# Patient Record
Sex: FEMALE | Race: OTHER | HISPANIC OR LATINO
[De-identification: names, ages, dates, MRNs, and addresses within clinical notes are randomized per-mention and may not be internally consistent; named-entity substitution may affect disease eponyms.]

---

## 2018-01-31 ENCOUNTER — APPOINTMENT (OUTPATIENT)
Dept: NEUROLOGY | Facility: CLINIC | Age: 43
End: 2018-01-31
Payer: MEDICAID

## 2018-01-31 VITALS
BODY MASS INDEX: 30.21 KG/M2 | HEIGHT: 61 IN | DIASTOLIC BLOOD PRESSURE: 80 MMHG | WEIGHT: 160 LBS | SYSTOLIC BLOOD PRESSURE: 130 MMHG

## 2018-01-31 PROCEDURE — 99214 OFFICE O/P EST MOD 30 MIN: CPT

## 2018-04-04 ENCOUNTER — APPOINTMENT (OUTPATIENT)
Dept: NEUROLOGY | Facility: CLINIC | Age: 43
End: 2018-04-04
Payer: MEDICAID

## 2018-04-04 VITALS
WEIGHT: 160 LBS | BODY MASS INDEX: 30.21 KG/M2 | DIASTOLIC BLOOD PRESSURE: 74 MMHG | SYSTOLIC BLOOD PRESSURE: 132 MMHG | HEIGHT: 61 IN

## 2018-04-04 DIAGNOSIS — G44.89 OTHER HEADACHE SYNDROME: ICD-10-CM

## 2018-04-04 DIAGNOSIS — Z86.59 PERSONAL HISTORY OF OTHER MENTAL AND BEHAVIORAL DISORDERS: ICD-10-CM

## 2018-04-04 PROCEDURE — 99214 OFFICE O/P EST MOD 30 MIN: CPT

## 2018-04-04 RX ORDER — FLUOXETINE HYDROCHLORIDE 20 MG/1
20 TABLET ORAL
Refills: 0 | Status: COMPLETED | COMMUNITY
End: 2018-04-04

## 2018-04-04 RX ORDER — TOPIRAMATE 25 MG/1
25 TABLET, FILM COATED ORAL
Qty: 30 | Refills: 2 | Status: COMPLETED | COMMUNITY
Start: 2018-01-31 | End: 2018-04-04

## 2018-05-16 ENCOUNTER — APPOINTMENT (OUTPATIENT)
Dept: NEUROLOGY | Facility: CLINIC | Age: 43
End: 2018-05-16

## 2018-06-10 ENCOUNTER — EMERGENCY (EMERGENCY)
Facility: HOSPITAL | Age: 43
LOS: 1 days | Discharge: HOME | End: 2018-06-10
Attending: EMERGENCY MEDICINE
Payer: COMMERCIAL

## 2018-06-10 VITALS
SYSTOLIC BLOOD PRESSURE: 132 MMHG | RESPIRATION RATE: 18 BRPM | OXYGEN SATURATION: 98 % | TEMPERATURE: 98 F | HEART RATE: 68 BPM | DIASTOLIC BLOOD PRESSURE: 72 MMHG

## 2018-06-10 VITALS — HEIGHT: 62 IN | WEIGHT: 139.99 LBS

## 2018-06-10 LAB
ALBUMIN SERPL ELPH-MCNC: 4.3 G/DL — SIGNIFICANT CHANGE UP (ref 3.3–5.2)
ALP SERPL-CCNC: 49 U/L — SIGNIFICANT CHANGE UP (ref 40–120)
ALT FLD-CCNC: 14 U/L — SIGNIFICANT CHANGE UP
ANION GAP SERPL CALC-SCNC: 11 MMOL/L — SIGNIFICANT CHANGE UP (ref 5–17)
AST SERPL-CCNC: 18 U/L — SIGNIFICANT CHANGE UP
BASOPHILS # BLD AUTO: 0 K/UL — SIGNIFICANT CHANGE UP (ref 0–0.2)
BASOPHILS NFR BLD AUTO: 0.5 % — SIGNIFICANT CHANGE UP (ref 0–2)
BILIRUB SERPL-MCNC: 0.2 MG/DL — LOW (ref 0.4–2)
BUN SERPL-MCNC: 18 MG/DL — SIGNIFICANT CHANGE UP (ref 8–20)
CALCIUM SERPL-MCNC: 9 MG/DL — SIGNIFICANT CHANGE UP (ref 8.6–10.2)
CHLORIDE SERPL-SCNC: 106 MMOL/L — SIGNIFICANT CHANGE UP (ref 98–107)
CK MB CFR SERPL CALC: 4.3 NG/ML — SIGNIFICANT CHANGE UP (ref 0–6.7)
CK SERPL-CCNC: 229 U/L — HIGH (ref 25–170)
CO2 SERPL-SCNC: 24 MMOL/L — SIGNIFICANT CHANGE UP (ref 22–29)
CREAT SERPL-MCNC: 0.66 MG/DL — SIGNIFICANT CHANGE UP (ref 0.5–1.3)
EOSINOPHIL # BLD AUTO: 0.1 K/UL — SIGNIFICANT CHANGE UP (ref 0–0.5)
EOSINOPHIL NFR BLD AUTO: 1.5 % — SIGNIFICANT CHANGE UP (ref 0–6)
GLUCOSE SERPL-MCNC: 110 MG/DL — SIGNIFICANT CHANGE UP (ref 70–115)
HCG SERPL-ACNC: <5 MIU/ML — SIGNIFICANT CHANGE UP
HCT VFR BLD CALC: 39.7 % — SIGNIFICANT CHANGE UP (ref 37–47)
HGB BLD-MCNC: 12.9 G/DL — SIGNIFICANT CHANGE UP (ref 12–16)
LYMPHOCYTES # BLD AUTO: 1.8 K/UL — SIGNIFICANT CHANGE UP (ref 1–4.8)
LYMPHOCYTES # BLD AUTO: 44.3 % — SIGNIFICANT CHANGE UP (ref 20–55)
MCHC RBC-ENTMCNC: 28.4 PG — SIGNIFICANT CHANGE UP (ref 27–31)
MCHC RBC-ENTMCNC: 32.5 G/DL — SIGNIFICANT CHANGE UP (ref 32–36)
MCV RBC AUTO: 87.4 FL — SIGNIFICANT CHANGE UP (ref 81–99)
MONOCYTES # BLD AUTO: 0.5 K/UL — SIGNIFICANT CHANGE UP (ref 0–0.8)
MONOCYTES NFR BLD AUTO: 12.8 % — HIGH (ref 3–10)
NEUTROPHILS # BLD AUTO: 1.6 K/UL — LOW (ref 1.8–8)
NEUTROPHILS NFR BLD AUTO: 40.9 % — SIGNIFICANT CHANGE UP (ref 37–73)
PLATELET # BLD AUTO: 240 K/UL — SIGNIFICANT CHANGE UP (ref 150–400)
POTASSIUM SERPL-MCNC: 4 MMOL/L — SIGNIFICANT CHANGE UP (ref 3.5–5.3)
POTASSIUM SERPL-SCNC: 4 MMOL/L — SIGNIFICANT CHANGE UP (ref 3.5–5.3)
PROT SERPL-MCNC: 7.8 G/DL — SIGNIFICANT CHANGE UP (ref 6.6–8.7)
RBC # BLD: 4.54 M/UL — SIGNIFICANT CHANGE UP (ref 4.4–5.2)
RBC # FLD: 13.9 % — SIGNIFICANT CHANGE UP (ref 11–15.6)
SODIUM SERPL-SCNC: 141 MMOL/L — SIGNIFICANT CHANGE UP (ref 135–145)
TROPONIN T SERPL-MCNC: <0.01 NG/ML — SIGNIFICANT CHANGE UP (ref 0–0.06)
WBC # BLD: 4 K/UL — LOW (ref 4.8–10.8)
WBC # FLD AUTO: 4 K/UL — LOW (ref 4.8–10.8)

## 2018-06-10 RX ORDER — ACETAMINOPHEN 500 MG
650 TABLET ORAL ONCE
Qty: 0 | Refills: 0 | Status: COMPLETED | OUTPATIENT
Start: 2018-06-10 | End: 2018-06-10

## 2018-06-10 RX ADMIN — Medication 650 MILLIGRAM(S): at 15:22

## 2018-06-10 NOTE — ED STATDOCS - ATTENDING CONTRIBUTION TO CARE
seen with acp  c/o chest pain radiating to the elbow  family hx of cardiac problems  EKG shows sinus bradycardia rate of 50  will transfer to Main for monitoring  Agree with acps assessment hx and physical

## 2018-06-10 NOTE — ED PROVIDER NOTE - MEDICAL DECISION MAKING DETAILS
41 yo F with elbow pain. Likely due to overuse vs radiculopathy. Check labs and eval for cardiac etiology, though clinically doubt.

## 2018-06-10 NOTE — ED STATDOCS - PROGRESS NOTE DETAILS
patient c/o R arm pain radiating up her chest started yesterday worsening today, denies any trauma or falls, +family h/o MI, denies smoking or h/o HTN/DM, EKG ordered and labs ordered placed in MAIN for monitor setting for atypical CP

## 2018-06-10 NOTE — ED PROVIDER NOTE - MUSCULOSKELETAL, MLM
TTP diffusely over the R shoulder and right elbow, and TTP to the R upper chest, no effusion, no swelling, sensation intact, 2+ radial and ulnar pulses

## 2018-06-10 NOTE — ED PROVIDER NOTE - OBJECTIVE STATEMENT
Pt is a 41 y/o F presenting to the ED with c/o right elbow pain, onset 2 days. Pt states the pain has been gradual, with progression of pain up the arm and into the R side of her chest. It is worse with movement of the arm and relieved with rest. Denies SOB, N/V, SWEET. Pain is not worse with general exertion. Denies trauma. Pt took motrin prior to arrival with some improvement in pain. Reports FHx of cardiac disease. Denies PMHx. Works as a .

## 2019-01-12 ENCOUNTER — EMERGENCY (EMERGENCY)
Facility: HOSPITAL | Age: 44
LOS: 1 days | Discharge: HOME | End: 2019-01-12
Attending: EMERGENCY MEDICINE
Payer: COMMERCIAL

## 2019-01-12 VITALS
RESPIRATION RATE: 16 BRPM | OXYGEN SATURATION: 99 % | HEIGHT: 63 IN | DIASTOLIC BLOOD PRESSURE: 85 MMHG | SYSTOLIC BLOOD PRESSURE: 128 MMHG | WEIGHT: 160.06 LBS | TEMPERATURE: 98 F | HEART RATE: 63 BPM

## 2019-01-12 DIAGNOSIS — Z90.49 ACQUIRED ABSENCE OF OTHER SPECIFIED PARTS OF DIGESTIVE TRACT: Chronic | ICD-10-CM

## 2019-01-12 DIAGNOSIS — Z98.51 TUBAL LIGATION STATUS: Chronic | ICD-10-CM

## 2019-01-12 LAB
ALBUMIN SERPL ELPH-MCNC: 4.4 G/DL — SIGNIFICANT CHANGE UP (ref 3.3–5.2)
ALP SERPL-CCNC: 52 U/L — SIGNIFICANT CHANGE UP (ref 40–120)
ALT FLD-CCNC: 13 U/L — SIGNIFICANT CHANGE UP
ANION GAP SERPL CALC-SCNC: 4 MMOL/L — LOW (ref 5–17)
APTT BLD: 30.2 SEC — SIGNIFICANT CHANGE UP (ref 27.5–36.3)
AST SERPL-CCNC: 18 U/L — SIGNIFICANT CHANGE UP
BILIRUB SERPL-MCNC: <0.2 MG/DL — LOW (ref 0.4–2)
BUN SERPL-MCNC: 20 MG/DL — SIGNIFICANT CHANGE UP (ref 8–20)
CALCIUM SERPL-MCNC: 9.2 MG/DL — SIGNIFICANT CHANGE UP (ref 8.6–10.2)
CHLORIDE SERPL-SCNC: 104 MMOL/L — SIGNIFICANT CHANGE UP (ref 98–107)
CO2 SERPL-SCNC: 24 MMOL/L — SIGNIFICANT CHANGE UP (ref 22–29)
CREAT SERPL-MCNC: 0.69 MG/DL — SIGNIFICANT CHANGE UP (ref 0.5–1.3)
GLUCOSE SERPL-MCNC: 96 MG/DL — SIGNIFICANT CHANGE UP (ref 70–115)
HCG SERPL-ACNC: <4 MIU/ML — SIGNIFICANT CHANGE UP
HCT VFR BLD CALC: 39 % — SIGNIFICANT CHANGE UP (ref 37–47)
HGB BLD-MCNC: 12.9 G/DL — SIGNIFICANT CHANGE UP (ref 12–16)
INR BLD: 1.03 RATIO — SIGNIFICANT CHANGE UP (ref 0.88–1.16)
MCHC RBC-ENTMCNC: 28.9 PG — SIGNIFICANT CHANGE UP (ref 27–31)
MCHC RBC-ENTMCNC: 33.1 G/DL — SIGNIFICANT CHANGE UP (ref 32–36)
MCV RBC AUTO: 87.4 FL — SIGNIFICANT CHANGE UP (ref 81–99)
PLATELET # BLD AUTO: 248 K/UL — SIGNIFICANT CHANGE UP (ref 150–400)
POTASSIUM SERPL-MCNC: 3.9 MMOL/L — SIGNIFICANT CHANGE UP (ref 3.5–5.3)
POTASSIUM SERPL-SCNC: 3.9 MMOL/L — SIGNIFICANT CHANGE UP (ref 3.5–5.3)
PROT SERPL-MCNC: 7.8 G/DL — SIGNIFICANT CHANGE UP (ref 6.6–8.7)
PROTHROM AB SERPL-ACNC: 11.9 SEC — SIGNIFICANT CHANGE UP (ref 10–12.9)
RBC # BLD: 4.46 M/UL — SIGNIFICANT CHANGE UP (ref 4.4–5.2)
RBC # FLD: 13.9 % — SIGNIFICANT CHANGE UP (ref 11–15.6)
SODIUM SERPL-SCNC: 132 MMOL/L — LOW (ref 135–145)
TROPONIN T SERPL-MCNC: <0.01 NG/ML — SIGNIFICANT CHANGE UP (ref 0–0.06)
WBC # BLD: 5.3 K/UL — SIGNIFICANT CHANGE UP (ref 4.8–10.8)
WBC # FLD AUTO: 5.3 K/UL — SIGNIFICANT CHANGE UP (ref 4.8–10.8)

## 2019-01-12 PROCEDURE — 93010 ELECTROCARDIOGRAM REPORT: CPT

## 2019-01-12 PROCEDURE — 93005 ELECTROCARDIOGRAM TRACING: CPT

## 2019-01-12 PROCEDURE — 85610 PROTHROMBIN TIME: CPT

## 2019-01-12 PROCEDURE — 85027 COMPLETE CBC AUTOMATED: CPT

## 2019-01-12 PROCEDURE — 99284 EMERGENCY DEPT VISIT MOD MDM: CPT | Mod: 25

## 2019-01-12 PROCEDURE — 84702 CHORIONIC GONADOTROPIN TEST: CPT

## 2019-01-12 PROCEDURE — 36415 COLL VENOUS BLD VENIPUNCTURE: CPT

## 2019-01-12 PROCEDURE — 85730 THROMBOPLASTIN TIME PARTIAL: CPT

## 2019-01-12 PROCEDURE — 99284 EMERGENCY DEPT VISIT MOD MDM: CPT

## 2019-01-12 PROCEDURE — 84484 ASSAY OF TROPONIN QUANT: CPT

## 2019-01-12 PROCEDURE — 80053 COMPREHEN METABOLIC PANEL: CPT

## 2019-01-12 RX ORDER — ASPIRIN/CALCIUM CARB/MAGNESIUM 324 MG
81 TABLET ORAL ONCE
Qty: 0 | Refills: 0 | Status: COMPLETED | OUTPATIENT
Start: 2019-01-12 | End: 2019-01-12

## 2019-01-12 RX ADMIN — Medication 81 MILLIGRAM(S): at 22:49

## 2019-01-12 NOTE — ED ADULT NURSE NOTE - OBJECTIVE STATEMENT
pt reports she felt dizzy, tired, fatigue since yesterday. denies CP, reports SOB with fast walking.

## 2019-01-12 NOTE — ED PROVIDER NOTE - OBJECTIVE STATEMENT
44 yo female w/o significant past medical history presenting with chief complain of lightheadedness.   Patient states that yesterday while seating on a chair she experienced acute onset generalized weakness, shortness of breath and palpitations associated with lightheadedness that lasted < 10 minutes. Patient states she presented a new episode a few hours after with the same characteristics associated with precordial pressure like chest pain 5/10 intensity, non radiating, that lasted <5 minutes improved after one dose of 81mg of Aspirin.  Patient denies previous episodes in the past, recent respiratory infection, cough, pedal edema, orthopnea, abdominal pain, fever, nausea, vomiting.

## 2019-01-12 NOTE — ED PROVIDER NOTE - DISCHARGE DATE
Patient states she lost her mucous plug. She is still adamant about not being induced yet but she is willing to be induced on Saturday next time I am on-call if she still pregnant. She wants her membranes swept today. Told her that her cervix is becoming more favorable.  Labor precautions given.  [facility] reviewed.   13-Jan-2019

## 2019-01-12 NOTE — ED PROVIDER NOTE - PHYSICAL EXAMINATION
Vital Signs Last 24 Hrs  T(C): 36.7 (12 Jan 2019 20:09), Max: 36.7 (12 Jan 2019 20:09)  T(F): 98 (12 Jan 2019 20:09), Max: 98 (12 Jan 2019 20:09)  HR: 65 (12 Jan 2019 22:45) (63 - 65)  BP: 128/85 (12 Jan 2019 20:09) (128/85 - 128/85)  RR: 16 (12 Jan 2019 20:09) (16 - 16)  SpO2: 99% (12 Jan 2019 20:09) (99% - 99%)

## 2019-01-12 NOTE — ED PROVIDER NOTE - ATTENDING CONTRIBUTION TO CARE
42 yo M with no significant PMH c/o dizziness which describes as lightheadedness x last 2 days with assoc palpitations.  Had assoc CP and SOB with episode 2 days ago, but no recurrent episodes.  no assoc , N/V, diaphoresis or syncope.  No recent illness or fever.  on exam awake and alert in NAD, PERRL, EOMI, mm moist, Neck supple, Cor Reg, with occ ectopy, Lungs cleat b/l, Abd soft,. NT, Ext no edema, Neuro intact, EKG sinus with occ PAC, no acute changes,  Labs as noted and pt monitored with no significant dysrhythmia.  stable for d/c with Cardio f/u as outpt

## 2019-01-12 NOTE — ED STATDOCS - PROGRESS NOTE DETAILS
42 y/o F pt with no pert. hx presents to ED c/o intermittent dizziness, fatigue, and weakness since yesterday. She states that last night she felt very anxious with chest tightness for approx. 30 minutes. Pt has never received any cardiac workup in the past. Pt ate rice today and broccoli today; pt is tolerating PO intake and having normal PO intake. Pt took an ASA last night for her chest tightness. Denies specific LE weakness, n/v/d, fever, chills, and sick contact. No further complaints at this time.  PCP: Yancy

## 2019-01-12 NOTE — ED STATDOCS - NS_ ATTENDINGSCRIBEDETAILS _ED_A_ED_FT
I, Kurt Hooper, performed the initial face to face bedside interview with this patient regarding history of present illness, review of symptoms and relevant past medical, social and family history.  I completed an independent physical examination.  I was the initial provider who evaluated this patient. I have signed out the follow up of any pending tests (i.e. labs, radiological studies) to the ACP.  I have communicated the patient’s plan of care and disposition with the ACP.  The history, relevant review of systems, past medical and surgical history, medical decision making, and physical examination was documented by the scribe in my presence and I attest to the accuracy of the documentation. I, Kurt Hooper, performed the initial face to face bedside interview with this patient regarding history of present illness, review of symptoms and relevant past medical, social and family history.  I completed an independent physical examination.   The history, relevant review of systems, past medical and surgical history, medical decision making, and physical examination was documented by the scribe in my presence and I attest to the accuracy of the documentation.

## 2019-01-13 VITALS
OXYGEN SATURATION: 98 % | HEART RATE: 65 BPM | DIASTOLIC BLOOD PRESSURE: 66 MMHG | SYSTOLIC BLOOD PRESSURE: 125 MMHG | RESPIRATION RATE: 19 BRPM | TEMPERATURE: 98 F

## 2019-01-15 ENCOUNTER — NON-APPOINTMENT (OUTPATIENT)
Age: 44
End: 2019-01-15

## 2019-01-15 ENCOUNTER — APPOINTMENT (OUTPATIENT)
Dept: CARDIOLOGY | Facility: CLINIC | Age: 44
End: 2019-01-15
Payer: MEDICAID

## 2019-01-15 VITALS
HEIGHT: 63 IN | HEART RATE: 59 BPM | SYSTOLIC BLOOD PRESSURE: 122 MMHG | BODY MASS INDEX: 29.41 KG/M2 | DIASTOLIC BLOOD PRESSURE: 77 MMHG | WEIGHT: 166 LBS | OXYGEN SATURATION: 100 % | RESPIRATION RATE: 18 BRPM

## 2019-01-15 VITALS — DIASTOLIC BLOOD PRESSURE: 74 MMHG | SYSTOLIC BLOOD PRESSURE: 124 MMHG

## 2019-01-15 DIAGNOSIS — R06.09 OTHER FORMS OF DYSPNEA: ICD-10-CM

## 2019-01-15 DIAGNOSIS — Z83.3 FAMILY HISTORY OF DIABETES MELLITUS: ICD-10-CM

## 2019-01-15 DIAGNOSIS — R42 DIZZINESS AND GIDDINESS: ICD-10-CM

## 2019-01-15 DIAGNOSIS — Z83.438 FAMILY HISTORY OF OTHER DISORDER OF LIPOPROTEIN METABOLISM AND OTHER LIPIDEMIA: ICD-10-CM

## 2019-01-15 DIAGNOSIS — Z78.9 OTHER SPECIFIED HEALTH STATUS: ICD-10-CM

## 2019-01-15 DIAGNOSIS — Z82.49 FAMILY HISTORY OF ISCHEMIC HEART DISEASE AND OTHER DISEASES OF THE CIRCULATORY SYSTEM: ICD-10-CM

## 2019-01-15 DIAGNOSIS — R00.2 PALPITATIONS: ICD-10-CM

## 2019-01-15 PROCEDURE — 93000 ELECTROCARDIOGRAM COMPLETE: CPT

## 2019-01-15 PROCEDURE — 99245 OFF/OP CONSLTJ NEW/EST HI 55: CPT

## 2019-01-15 RX ORDER — NORTRIPTYLINE HYDROCHLORIDE 50 MG/1
50 CAPSULE ORAL
Qty: 30 | Refills: 2 | Status: DISCONTINUED | COMMUNITY
Start: 2018-04-04 | End: 2019-01-15

## 2019-01-15 NOTE — DISCUSSION/SUMMARY
[Patient] : the patient [Risks] : risks [Benefits] : benefits [Alternatives] : alternatives [___ Month(s)] : [unfilled] month(s) [With Me] : with me [FreeTextEntry1] : This is a 43 year old woman with no significant past medical history with dyspnea on exertion and palpitations\par 1) Dyspnea one xertion: stress echo. 2 D echo.\par 2) palpitations: reassruance. if symptoms persistent then 48 hr HOLTEr. \par 3) Dizziness: reassurance. If symptoms persistent then carotid Duplex.

## 2019-01-15 NOTE — REVIEW OF SYSTEMS
[Fever] : no fever [Headache] : no headache [Recent Weight Gain (___ Lbs)] : no recent weight gain [Chills] : no chills [Recent Weight Loss (___ Lbs)] : no recent weight loss [see HPI] : see HPI [Shortness Of Breath] : shortness of breath [Dyspnea on exertion] : dyspnea during exertion [Chest  Pressure] : no chest pressure [Chest Pain] : no chest pain [Lower Ext Edema] : no extremity edema [Leg Claudication] : no intermittent leg claudication [Palpitations] : palpitations [Negative] : Heme/Lymph

## 2019-01-15 NOTE — REASON FOR VISIT
[Initial Evaluation] : an initial evaluation of [FreeTextEntry2] : fatigue and dyspnea on exertion weakness [FreeTextEntry1] : fatigue and dyspnea on exertion weakness

## 2019-01-15 NOTE — PHYSICAL EXAM
[General Appearance - Well Developed] : well developed [Normal Appearance] : normal appearance [Well Groomed] : well groomed [General Appearance - Well Nourished] : well nourished [No Deformities] : no deformities [General Appearance - In No Acute Distress] : no acute distress [Normal Conjunctiva] : the conjunctiva exhibited no abnormalities [Eyelids - No Xanthelasma] : the eyelids demonstrated no xanthelasmas [Normal Oral Mucosa] : normal oral mucosa [No Oral Pallor] : no oral pallor [No Oral Cyanosis] : no oral cyanosis [Normal Jugular Venous A Waves Present] : normal jugular venous A waves present [Normal Jugular Venous V Waves Present] : normal jugular venous V waves present [No Jugular Venous Solorzano A Waves] : no jugular venous solorzano A waves [Heart Rate And Rhythm] : heart rate and rhythm were normal [Heart Sounds] : normal S1 and S2 [Murmurs] : no murmurs present [Respiration, Rhythm And Depth] : normal respiratory rhythm and effort [Exaggerated Use Of Accessory Muscles For Inspiration] : no accessory muscle use [Auscultation Breath Sounds / Voice Sounds] : lungs were clear to auscultation bilaterally [Abdomen Soft] : soft [Abdomen Tenderness] : non-tender [Abdomen Mass (___ Cm)] : no abdominal mass palpated [Abnormal Walk] : normal gait [Gait - Sufficient For Exercise Testing] : the gait was sufficient for exercise testing [Nail Clubbing] : no clubbing of the fingernails [Cyanosis, Localized] : no localized cyanosis [Petechial Hemorrhages (___cm)] : no petechial hemorrhages [Skin Color & Pigmentation] : normal skin color and pigmentation [] : no rash [No Venous Stasis] : no venous stasis [Skin Lesions] : no skin lesions [No Skin Ulcers] : no skin ulcer [No Xanthoma] : no  xanthoma was observed [Oriented To Time, Place, And Person] : oriented to person, place, and time [Affect] : the affect was normal [Mood] : the mood was normal [No Anxiety] : not feeling anxious

## 2019-01-15 NOTE — HISTORY OF PRESENT ILLNESS
[FreeTextEntry1] : fatigue and dyspnea on exertion weakness\par \par This is a 43 year old woman with no significant past medical history with dyspnea on exertion and fatigue. \par for few days she has dyspnea on exertion for 2 days. 3/10, not assoicated with chest pain. worse on walking.relieves at rest.\par also has palpitations. no syncope + dizziness. palpitations for only 1 day,.intermittent. comes and goes. did not last long. lasted only for few mins. \par \par

## 2019-01-23 ENCOUNTER — APPOINTMENT (OUTPATIENT)
Dept: CARDIOLOGY | Facility: CLINIC | Age: 44
End: 2019-01-23
Payer: MEDICAID

## 2019-01-23 PROCEDURE — 93306 TTE W/DOPPLER COMPLETE: CPT

## 2019-01-27 ENCOUNTER — RESULT REVIEW (OUTPATIENT)
Age: 44
End: 2019-01-27

## 2019-02-20 ENCOUNTER — APPOINTMENT (OUTPATIENT)
Dept: CARDIOLOGY | Facility: CLINIC | Age: 44
End: 2019-02-20

## 2019-03-06 ENCOUNTER — APPOINTMENT (OUTPATIENT)
Dept: CARDIOLOGY | Facility: CLINIC | Age: 44
End: 2019-03-06

## 2019-05-08 ENCOUNTER — APPOINTMENT (OUTPATIENT)
Dept: CARDIOLOGY | Facility: CLINIC | Age: 44
End: 2019-05-08

## 2020-01-29 ENCOUNTER — APPOINTMENT (OUTPATIENT)
Dept: DERMATOLOGY | Facility: CLINIC | Age: 45
End: 2020-01-29

## 2020-05-05 ENCOUNTER — APPOINTMENT (OUTPATIENT)
Dept: CARDIOLOGY | Facility: CLINIC | Age: 45
End: 2020-05-05

## 2021-07-15 ENCOUNTER — APPOINTMENT (OUTPATIENT)
Dept: NEUROLOGY | Facility: CLINIC | Age: 46
End: 2021-07-15
Payer: MEDICAID

## 2021-07-15 VITALS
HEIGHT: 63 IN | WEIGHT: 170 LBS | SYSTOLIC BLOOD PRESSURE: 119 MMHG | DIASTOLIC BLOOD PRESSURE: 84 MMHG | HEART RATE: 66 BPM | BODY MASS INDEX: 30.12 KG/M2 | TEMPERATURE: 97.2 F

## 2021-07-15 DIAGNOSIS — G43.709 CHRONIC MIGRAINE W/OUT AURA, NOT INTRACTABLE, W/OUT STATUS MIGRAINOSUS: ICD-10-CM

## 2021-07-15 PROCEDURE — 99072 ADDL SUPL MATRL&STAF TM PHE: CPT

## 2021-07-15 PROCEDURE — 99204 OFFICE O/P NEW MOD 45 MIN: CPT

## 2021-07-15 NOTE — DATA REVIEWED
[de-identified] :   EXAM:  CT BRAIN; W O CON                      \par \par \par PROCEDURE DATE:  10/13/2014\par \par \par INTERPRETATION:  .\par \par CLINICAL INFORMATION: Headache\par \par TECHNIQUE: Multiple axial CT images of the head were obtained without \par contrast. Sagittal and coronal reconstructed images were acquired from \par the source data.\par \par COMPARISON: No prior CT studies of the brain are available for comparison \par at this institution.\par \par FINDINGS: There is no acute intracranial hemorrhage, mass effect, midline \par shift, CT evidence of acute territorial infarction, herniation, \par extra-axial fluid collection, or evidence of obstructive hydrocephalus.\par \par The paranasal sinuses and mastoid air cells are clear. The orbits are \par unremarkable. The calvarium is intact. A small right parietal scalp \par sebaceous cyst or scar is notable.\par \par IMPRESSION: No acute intracranial hemorrhage, mass effect, or midline \par shift.\par

## 2021-07-15 NOTE — HISTORY OF PRESENT ILLNESS
[FreeTextEntry1] : 45-year-old woman right-handed history of migraine headaches, complaints of recurring headaches, previously treated with topiramate, nortriptyline, but either did not help or unable to tolerate it. headaches occurring 2 or 3 times a week, over able duration and intensity, described as pressure, back and then intensified, becoming throbbing pounding, bilateral, extra feel nauseous, lightheaded, lites and sounds as a sensitive. Usually takes Tylenol, or ibuprofen was some improvement.Most common trigger distress, not menstrually related. Denies any associated loss of vision, trouble speaking, unilateral numbness or tingling, weakness of the face or extremities.\par The headaches are remained pretty much about the same, although over the last 8-9 months and becoming more persistent. headaches unchanged with physical activity.\par Previous evaluation including a CT scan of the head, and last year she underwent an MRI of the cervical spine, from [facility], performed June 2, 2019; impression: Disc desiccation, left uncinate spurring producing moderate left foraminal narrowing at C3/C4. Bilateral uncinate spur producing moderate bilateral foramina stenosis at C4/C5, left uncinate spurring producing moderate re C5/C6.

## 2021-07-15 NOTE — DISCUSSION/SUMMARY
[FreeTextEntry1] : 45-year-old woman with a history of chronic headaches, likely common migraines. Nonfocal exam. Previously treated with topiramate and nortriptyline, but unable to tolerate most medications. Headaches occurring 9-12 times a month, not responding well to over-the-counter analgesics.\par Discuss treatment options, patient not willing to try any subcutaneous injections, recommended trial with Aimovig or Ajovy.\par plan: Nurtec 75 mg po QOD.\par I advised to maintain a headache diary.\par Reevaluate in 6-8 weeks

## 2021-07-15 NOTE — PHYSICAL EXAM
[General Appearance - Alert] : alert [General Appearance - In No Acute Distress] : in no acute distress [Oriented To Time, Place, And Person] : oriented to person, place, and time [Impaired Insight] : insight and judgment were intact [Affect] : the affect was normal [Person] : oriented to person [Place] : oriented to place [Time] : oriented to time [Concentration Intact] : normal concentrating ability [Visual Intact] : visual attention was ~T not ~L decreased [Naming Objects] : no difficulty naming common objects [Repeating Phrases] : no difficulty repeating a phrase [Writing A Sentence] : no difficulty writing a sentence [Fluency] : fluency intact [Comprehension] : comprehension intact [Reading] : reading intact [Past History] : adequate knowledge of personal past history [Cranial Nerves Optic (II)] : visual acuity intact bilaterally,  visual fields full to confrontation, pupils equal round and reactive to light [Cranial Nerves Oculomotor (III)] : extraocular motion intact [Cranial Nerves Trigeminal (V)] : facial sensation intact symmetrically [Cranial Nerves Facial (VII)] : face symmetrical [Cranial Nerves Vestibulocochlear (VIII)] : hearing was intact bilaterally [Cranial Nerves Glossopharyngeal (IX)] : tongue and palate midline [Cranial Nerves Accessory (XI - Cranial And Spinal)] : head turning and shoulder shrug symmetric [Cranial Nerves Hypoglossal (XII)] : there was no tongue deviation with protrusion [Motor Tone] : muscle tone was normal in all four extremities [Motor Strength] : muscle strength was normal in all four extremities [No Muscle Atrophy] : normal bulk in all four extremities [Paresis Pronator Drift Right-Sided] : no pronator drift on the right [Paresis Pronator Drift Left-Sided] : no pronator drift on the left [Sensation Tactile Decrease] : light touch was intact [Abnormal Walk] : normal gait [Balance] : balance was intact [Past-pointing] : there was no past-pointing [Tremor] : no tremor present [Dysdiadochokinesia Bilaterally] : not present [Coordination - Dysmetria Impaired Finger-to-Nose Bilateral] : not present [Coordination - Dysmetria Impaired Heel-to-Shin Bilateral] : not present [2+] : Ankle jerk left 2+ [Plantar Reflex Right Only] : normal on the right [Plantar Reflex Left Only] : normal on the left [Sclera] : the sclera and conjunctiva were normal [PERRL With Normal Accommodation] : pupils were equal in size, round, reactive to light, with normal accommodation [Extraocular Movements] : extraocular movements were intact [Optic Disc Abnormality] : the optic disc were normal in size and color [Outer Ear] : the ears and nose were normal in appearance [Hearing Threshold Finger Rub Not [place]] : hearing was normal [Neck Appearance] : the appearance of the neck was normal [] : the neck was supple [Heart Rate And Rhythm] : heart rate was normal and rhythm regular [Heart Sounds] : normal S1 and S2 [Heart Sounds Gallop] : no gallops [Murmurs] : no murmurs [Arterial Pulses Carotid] : carotid pulses were normal with no bruits [Full Pulse] : the pedal pulses are present [Edema] : there was no peripheral edema [No Spinal Tenderness] : no spinal tenderness

## 2021-08-20 ENCOUNTER — APPOINTMENT (OUTPATIENT)
Dept: PODIATRY | Facility: CLINIC | Age: 46
End: 2021-08-20

## 2021-09-22 ENCOUNTER — APPOINTMENT (OUTPATIENT)
Dept: WOUND CARE | Facility: HOSPITAL | Age: 46
End: 2021-09-22
Payer: MEDICAID

## 2021-09-22 ENCOUNTER — OUTPATIENT (OUTPATIENT)
Dept: OUTPATIENT SERVICES | Facility: HOSPITAL | Age: 46
LOS: 1 days | Discharge: HOME | End: 2021-09-22
Payer: COMMERCIAL

## 2021-09-22 VITALS
WEIGHT: 170 LBS | BODY MASS INDEX: 30.12 KG/M2 | TEMPERATURE: 97.6 F | HEART RATE: 67 BPM | OXYGEN SATURATION: 100 % | RESPIRATION RATE: 18 BRPM | DIASTOLIC BLOOD PRESSURE: 78 MMHG | HEIGHT: 63 IN | SYSTOLIC BLOOD PRESSURE: 122 MMHG

## 2021-09-22 VITALS
DIASTOLIC BLOOD PRESSURE: 78 MMHG | SYSTOLIC BLOOD PRESSURE: 122 MMHG | HEIGHT: 63 IN | WEIGHT: 170 LBS | BODY MASS INDEX: 30.12 KG/M2 | RESPIRATION RATE: 18 BRPM | HEART RATE: 67 BPM | OXYGEN SATURATION: 100 % | TEMPERATURE: 97.6 F

## 2021-09-22 DIAGNOSIS — S91.309A UNSPECIFIED OPEN WOUND, UNSPECIFIED FOOT, INITIAL ENCOUNTER: ICD-10-CM

## 2021-09-22 DIAGNOSIS — Z90.49 ACQUIRED ABSENCE OF OTHER SPECIFIED PARTS OF DIGESTIVE TRACT: Chronic | ICD-10-CM

## 2021-09-22 DIAGNOSIS — Z98.51 TUBAL LIGATION STATUS: Chronic | ICD-10-CM

## 2021-09-22 PROCEDURE — G0463: CPT | Mod: 25

## 2021-09-22 PROCEDURE — 99204 OFFICE O/P NEW MOD 45 MIN: CPT

## 2021-09-22 PROCEDURE — 20600 DRAIN/INJ JOINT/BURSA W/O US: CPT | Mod: RT

## 2021-09-22 NOTE — PHYSICAL EXAM
[2+] : left 2+ [Ankle Swelling (On Exam)] : not present [Varicose Veins Of Lower Extremities] : not present [] : not present [de-identified] : A&Ox3, NAD [de-identified] : Pain elicited on palpation of the right plantar medial calcaneal tubercle in the region of the origin of the intrinsic musculature and plantar fascia, but no pain on medial-lateral compression of the calcaneus. Intact posterior tibial tendon, strength graded at 5/5. Ankle joint dorsiflexion is 0 degrees with the knee extended bilateral. (right plantar heel spur on xray report) [de-identified] : light touch sensation intact bilaterally [de-identified] : No open lesions, no lacerations, no ecchymosis, no erythema [de-identified] : DPM injected patient with 1 mL of Kenalog 40mg/mL and 1 mL of Dexamethasone 4mg/mL. Patient tolerated injection well. [FreeTextEntry1] : Right heel - no open wound - Plantar Fascitis [de-identified] : Bupivacaine HCl injection 0.5% 5mg/mL  [de-identified] : Cleansed with Normal Saline\par  [de-identified] : Circulation:\par \par CIRCULATION\par Dorsalis Pedis: R palpable  L palpable\par Posterior Tibialis: R palpable L palpable\par Extremity Color: Pink\par Extremity Temperature: Warm\par Capillary Refill: < 3 seconds bilaterally\par Vascular studies not ordered by DPM

## 2021-09-22 NOTE — REVIEW OF SYSTEMS
[Fever] : no fever [Eye Pain] : no eye pain [Earache] : no earache [Chest Pain] : no chest pain [Shortness Of Breath] : no shortness of breath [Cough] : no cough [Abdominal Pain] : no abdominal pain [FreeTextEntry9] : right heel pain

## 2021-09-22 NOTE — PLAN
[FreeTextEntry1] : Advised regarding conservative vs surgical treatment options. Advised regarding refraining from barefoot walking, use of an ice pack or soaks twice a day for 10-15 minutes. Recommended OTC oral anti inflammatory at this time; patient cautioned regarding GI ulcer risk.  Advised regarding posterior calf muscle stretching 3-5 times a day.  \par Due to the level of pain I have also recommended a corticosteroid injection. The symptomatic area was prepped numerous times with an alcohol solution.  Once this completely dried it was cleansed with alcohol after which a corticosteroid injection, consisting of 1 cc of marcaine 0.5%, 1 cc of Dexamethasone 4mg/mL, and 1 cc of kenalog-40 was infiltrated in and around the symptomatic area with good relief obtained.  Hemostasis was achieved with compression, the skin was cleansed, and a dry sterile dressing applied.  The patient was cautioned regarding hypopigmentation, fat atrophy, rupture of involved ligamentous and tendinous structures, and steroid flare. Spent 45 minutes for patient care and medical decision making.\par \par

## 2021-09-22 NOTE — HISTORY OF PRESENT ILLNESS
[FreeTextEntry1] : Patient reports she is having severe pain of 10/10 to her right heel. Patient had xray done of right foot in August of this year revealing a small plantar calcaneal spur. Patient notes that she has pain moreso with the first few steps when walking. Patient notes that she works on her feet and states that the pain has gradually worsened. Denies any other complaints at this time.

## 2021-09-22 NOTE — VITALS
[Stabbing] : stabbing [] : Yes [de-identified] : Patient reports pain 10/10 [FreeTextEntry3] : Right Heel [FreeTextEntry1] : Sitting down [FreeTextEntry2] : Walking [FreeTextEntry4] : Sitting down at home

## 2021-09-22 NOTE — REASON FOR VISIT
[Consultation] : a consultation visit [Family Member] : family member [FreeTextEntry1] : Initial visit

## 2021-09-22 NOTE — ASSESSMENT
[Patient] : Patient [Family member] : Family member [Good - alert, interested, motivated] : Good - alert, interested, motivated [Verbalizes knowledge/Understanding] : Verbalizes knowledge/understanding [Foot Care] : foot care [Pressure relief] : pressure relief [Signs and symptoms of infection] : sign and symptoms of infection [How and When to Call] : how and when to call [Off-loading] : off-loading [] : Yes [Stable] : stable [Home] : Home [Ambulatory] : Ambulatory [Not Applicable - Long Term Care/Home Health Agency] : Long Term Care/Home Health Agency: Not Applicable [FreeTextEntry2] : MRI\par Offloading\par Plantar Fascitis Stretches [FreeTextEntry3] : Initial visit [FreeTextEntry4] : Patient to f/u in 2 weeks

## 2021-09-23 DIAGNOSIS — Z90.710 ACQUIRED ABSENCE OF BOTH CERVIX AND UTERUS: ICD-10-CM

## 2021-09-23 DIAGNOSIS — Z79.899 OTHER LONG TERM (CURRENT) DRUG THERAPY: ICD-10-CM

## 2021-09-23 DIAGNOSIS — M72.2 PLANTAR FASCIAL FIBROMATOSIS: ICD-10-CM

## 2021-09-23 DIAGNOSIS — Z90.49 ACQUIRED ABSENCE OF OTHER SPECIFIED PARTS OF DIGESTIVE TRACT: ICD-10-CM

## 2021-09-23 DIAGNOSIS — F32.9 MAJOR DEPRESSIVE DISORDER, SINGLE EPISODE, UNSPECIFIED: ICD-10-CM

## 2021-09-23 DIAGNOSIS — Z83.3 FAMILY HISTORY OF DIABETES MELLITUS: ICD-10-CM

## 2021-09-23 DIAGNOSIS — Z82.49 FAMILY HISTORY OF ISCHEMIC HEART DISEASE AND OTHER DISEASES OF THE CIRCULATORY SYSTEM: ICD-10-CM

## 2021-09-23 DIAGNOSIS — Z98.890 OTHER SPECIFIED POSTPROCEDURAL STATES: ICD-10-CM

## 2021-09-23 DIAGNOSIS — Z83.438 FAMILY HISTORY OF OTHER DISORDER OF LIPOPROTEIN METABOLISM AND OTHER LIPIDEMIA: ICD-10-CM

## 2021-12-28 ENCOUNTER — OUTPATIENT (OUTPATIENT)
Dept: OUTPATIENT SERVICES | Facility: HOSPITAL | Age: 46
LOS: 1 days | Discharge: HOME | End: 2021-12-28
Payer: COMMERCIAL

## 2021-12-28 ENCOUNTER — APPOINTMENT (OUTPATIENT)
Dept: WOUND CARE | Facility: HOSPITAL | Age: 46
End: 2021-12-28
Payer: MEDICAID

## 2021-12-28 VITALS
HEART RATE: 64 BPM | WEIGHT: 170 LBS | DIASTOLIC BLOOD PRESSURE: 72 MMHG | BODY MASS INDEX: 30.12 KG/M2 | RESPIRATION RATE: 18 BRPM | SYSTOLIC BLOOD PRESSURE: 126 MMHG | OXYGEN SATURATION: 100 % | HEIGHT: 63 IN | TEMPERATURE: 97.8 F

## 2021-12-28 VITALS
TEMPERATURE: 97.8 F | RESPIRATION RATE: 18 BRPM | WEIGHT: 170 LBS | BODY MASS INDEX: 30.12 KG/M2 | SYSTOLIC BLOOD PRESSURE: 126 MMHG | OXYGEN SATURATION: 100 % | HEART RATE: 64 BPM | DIASTOLIC BLOOD PRESSURE: 72 MMHG | HEIGHT: 63 IN

## 2021-12-28 DIAGNOSIS — Z90.49 ACQUIRED ABSENCE OF OTHER SPECIFIED PARTS OF DIGESTIVE TRACT: Chronic | ICD-10-CM

## 2021-12-28 DIAGNOSIS — S91.309A UNSPECIFIED OPEN WOUND, UNSPECIFIED FOOT, INITIAL ENCOUNTER: ICD-10-CM

## 2021-12-28 DIAGNOSIS — Z98.51 TUBAL LIGATION STATUS: Chronic | ICD-10-CM

## 2021-12-28 PROCEDURE — 99214 OFFICE O/P EST MOD 30 MIN: CPT

## 2021-12-28 PROCEDURE — 20600 DRAIN/INJ JOINT/BURSA W/O US: CPT | Mod: RT

## 2021-12-30 NOTE — VITALS
[Pain related to present condition?] : The patient's  pain is related to present condition. [] : No [FreeTextEntry3] : Right heel  (8/10) shooting pain [FreeTextEntry1] : sitting [FreeTextEntry2] : walking, morning [FreeTextEntry4] : sitting

## 2021-12-30 NOTE — PHYSICAL EXAM
[2+] : left 2+ [Ankle Swelling (On Exam)] : not present [Varicose Veins Of Lower Extremities] : not present [] : not present [de-identified] : A&Ox3, NAD [de-identified] : Pain elicited on palpation of the right plantar medial calcaneal tubercle in the region of the origin of the intrinsic musculature and plantar fascia, but no pain on medial-lateral compression of the calcaneus. Intact posterior tibial tendon, strength graded at 5/5. Ankle joint dorsiflexion is 0 degrees with the knee extended bilateral. (right plantar heel spur on xray report) [de-identified] : No open lesions, no lacerations, no ecchymosis, no erythema [de-identified] : light touch sensation intact bilaterally [de-identified] : DPM injected patient with 1 mL of Kenalog 40mg/mL and 1 mL of Dexamethasone 4mg/mL. Bandaids to medial ankle injection sites. Patient tolerated injection well.  0/10 pain post injection. [FreeTextEntry1] : Right heel - no open wound - Plantar Fascitis [de-identified] : Bupivacaine HCl injection 0.5% 5 mg/mL  [de-identified] : Bandaid [de-identified] : NSC [de-identified] : Circulation:\par \par CIRCULATION\par Dorsalis Pedis: R palpable  L palpable\par Posterior Tibialis: R palpable L palpable\par Extremity Color: Pink\par Extremity Temperature: Warm\par Capillary Refill: < 3 seconds bilaterally\par Vascular studies not ordered by DPM

## 2021-12-30 NOTE — ASSESSMENT
[Verbal] : Verbal [Patient] : Patient [Good - alert, interested, motivated] : Good - alert, interested, motivated [Verbalizes knowledge/Understanding] : Verbalizes knowledge/understanding [Foot Care] : foot care [Signs and symptoms of infection] : sign and symptoms of infection [How and When to Call] : how and when to call [Pain Management] : pain management [Stable] : stable [Home] : Home [Ambulatory] : Ambulatory [Not Applicable - Long Term Care/Home Health Agency] : Long Term Care/Home Health Agency: Not Applicable [] : No [FreeTextEntry2] : Pain management\par  [FreeTextEntry4] : \par Patient was last seen at [facility] on 9/22/21 and was instructed to f/u in 2 weeks.  Patient reports that she did not f/u in 2 weeks as recommended due to less pain.  Pain has recently returned.  Patient declines any change in medical hx since last visit.  Patient signed a new wound care consent today.\par \par Corticosteroid injection today.  Patient expressed relief post injection.  DPM instructed patient to f/u in 2 - 3 months or sooner if pain increases.  Discussed MRI if pain returns in 1 month or sooner.  Patient verbalized understanding.

## 2021-12-30 NOTE — HISTORY OF PRESENT ILLNESS
[FreeTextEntry1] : Patient seen for follow up of right heel pain. pt relates that the last corticosteroid injection helped alleviate the pain for over two months. Pt relates that she would like to have an injection again. Relates that her pain has returned and that she would like to have an injection again.

## 2021-12-30 NOTE — PLAN
[FreeTextEntry1] : Advised regarding conservative vs surgical treatment options. Patient related that she would like to pursue conservative therapy at this time, including corticosteroid injection. The symptomatic area was prepped numerous times with an alcohol solution.  Once this completely dried it was cleansed with alcohol after which a corticosteroid injection, consisting of 1 cc of marcaine 0.5%, 1 cc of Dexamethasone 4mg/mL, and 1 cc of kenalog-40 was infiltrated in and around the symptomatic area with good relief obtained.  Hemostasis was achieved with compression, the skin was cleansed, and a dry sterile dressing applied.  The patient was cautioned regarding hypopigmentation, fat atrophy, rupture of involved ligamentous and tendinous structures, and steroid flare. Spent 30 minutes for patient care and medical decision making.\par \par

## 2022-01-02 DIAGNOSIS — Z98.890 OTHER SPECIFIED POSTPROCEDURAL STATES: ICD-10-CM

## 2022-01-02 DIAGNOSIS — F32.9 MAJOR DEPRESSIVE DISORDER, SINGLE EPISODE, UNSPECIFIED: ICD-10-CM

## 2022-01-02 DIAGNOSIS — Z79.899 OTHER LONG TERM (CURRENT) DRUG THERAPY: ICD-10-CM

## 2022-01-02 DIAGNOSIS — Z83.3 FAMILY HISTORY OF DIABETES MELLITUS: ICD-10-CM

## 2022-01-02 DIAGNOSIS — M72.2 PLANTAR FASCIAL FIBROMATOSIS: ICD-10-CM

## 2022-01-02 DIAGNOSIS — Z82.49 FAMILY HISTORY OF ISCHEMIC HEART DISEASE AND OTHER DISEASES OF THE CIRCULATORY SYSTEM: ICD-10-CM

## 2022-01-02 DIAGNOSIS — Z90.710 ACQUIRED ABSENCE OF BOTH CERVIX AND UTERUS: ICD-10-CM

## 2022-01-02 DIAGNOSIS — Z90.49 ACQUIRED ABSENCE OF OTHER SPECIFIED PARTS OF DIGESTIVE TRACT: ICD-10-CM

## 2022-01-02 DIAGNOSIS — Z83.438 FAMILY HISTORY OF OTHER DISORDER OF LIPOPROTEIN METABOLISM AND OTHER LIPIDEMIA: ICD-10-CM

## 2022-07-08 ENCOUNTER — APPOINTMENT (OUTPATIENT)
Dept: PODIATRY | Facility: CLINIC | Age: 47
End: 2022-07-08

## 2022-07-08 VITALS
TEMPERATURE: 98 F | RESPIRATION RATE: 14 BRPM | SYSTOLIC BLOOD PRESSURE: 152 MMHG | WEIGHT: 170 LBS | OXYGEN SATURATION: 98 % | HEART RATE: 68 BPM | DIASTOLIC BLOOD PRESSURE: 91 MMHG | HEIGHT: 63 IN | BODY MASS INDEX: 30.12 KG/M2

## 2022-07-08 DIAGNOSIS — M79.671 PAIN IN RIGHT FOOT: ICD-10-CM

## 2022-07-08 PROCEDURE — 99213 OFFICE O/P EST LOW 20 MIN: CPT

## 2022-07-09 PROBLEM — M79.671 ACUTE FOOT PAIN, RIGHT: Status: ACTIVE | Noted: 2022-07-09

## 2022-07-09 NOTE — HISTORY OF PRESENT ILLNESS
[FreeTextEntry1] : Patient seen for follow up of right heel pain. pt relates that the last corticosteroid injection helped alleviate the pain for approximately two months but notes that the pain has returned. Inquires regarding treatment options. Denies any other complaints at this time.

## 2022-07-09 NOTE — ASSESSMENT
[FreeTextEntry1] : Advised regarding conservative vs surgical treatment options. Advised regarding refraining from barefoot walking, use of an ice pack or soaks twice a day for 10-15 minutes. Recommended OTC oral anti inflammatory at this time; patient cautioned regarding GI ulcer risk.  Advised regarding posterior calf muscle stretching 3-5 times a day.  Will obtain MRI to rule out plantar fascial tear at this time. Spent 20 minutes for patient care and medical decision making.\par

## 2022-07-09 NOTE — PHYSICAL EXAM
[General Appearance - Alert] : alert [General Appearance - In No Acute Distress] : in no acute distress [General Appearance - Well Nourished] : well nourished [General Appearance - Well Developed] : well developed [General Appearance - Well-Appearing] : healthy appearing [Ankle Swelling (On Exam)] : not present [Varicose Veins Of Lower Extremities] : not present [] : not present [2+] : left foot dorsalis pedis 2+ [de-identified] : Pain elicited on palpation of the right plantar medial calcaneal tubercle in the region of the origin of the intrinsic musculature and plantar fascia, but no pain on medial-lateral compression of the calcaneus. Intact posterior tibial tendon, strength graded at 5/5. Ankle joint dorsiflexion is 0 degrees with the knee extended bilateral. (right plantar heel spur on xray report).  [Sensation] : the sensory exam was normal to light touch and pinprick [Diminished Throughout Right Foot] : normal sensation with monofilament testing throughout right foot [Diminished Throughout Left Foot] : normal sensation with monofilament testing throughout left foot

## 2022-07-20 ENCOUNTER — OUTPATIENT (OUTPATIENT)
Dept: OUTPATIENT SERVICES | Facility: HOSPITAL | Age: 47
LOS: 1 days | End: 2022-07-20
Payer: COMMERCIAL

## 2022-07-20 ENCOUNTER — RESULT REVIEW (OUTPATIENT)
Age: 47
End: 2022-07-20

## 2022-07-20 ENCOUNTER — APPOINTMENT (OUTPATIENT)
Dept: MRI IMAGING | Facility: CLINIC | Age: 47
End: 2022-07-20

## 2022-07-20 DIAGNOSIS — M72.2 PLANTAR FASCIAL FIBROMATOSIS: ICD-10-CM

## 2022-07-29 ENCOUNTER — APPOINTMENT (OUTPATIENT)
Dept: PODIATRY | Facility: CLINIC | Age: 47
End: 2022-07-29

## 2022-08-05 ENCOUNTER — OUTPATIENT (OUTPATIENT)
Dept: OUTPATIENT SERVICES | Facility: HOSPITAL | Age: 47
LOS: 1 days | Discharge: HOME | End: 2022-08-05
Payer: COMMERCIAL

## 2022-08-05 ENCOUNTER — APPOINTMENT (OUTPATIENT)
Dept: WOUND CARE | Facility: HOSPITAL | Age: 47
End: 2022-08-05

## 2022-08-05 VITALS
BODY MASS INDEX: 30.12 KG/M2 | OXYGEN SATURATION: 98 % | RESPIRATION RATE: 16 BRPM | WEIGHT: 170 LBS | TEMPERATURE: 97.9 F | HEART RATE: 62 BPM | SYSTOLIC BLOOD PRESSURE: 131 MMHG | DIASTOLIC BLOOD PRESSURE: 89 MMHG | HEIGHT: 63 IN

## 2022-08-05 VITALS
OXYGEN SATURATION: 98 % | DIASTOLIC BLOOD PRESSURE: 89 MMHG | RESPIRATION RATE: 20 BRPM | SYSTOLIC BLOOD PRESSURE: 131 MMHG | BODY MASS INDEX: 30.12 KG/M2 | WEIGHT: 170 LBS | TEMPERATURE: 97.9 F | HEIGHT: 63 IN | HEART RATE: 62 BPM

## 2022-08-05 DIAGNOSIS — Z78.9 OTHER SPECIFIED HEALTH STATUS: ICD-10-CM

## 2022-08-05 DIAGNOSIS — Z98.51 TUBAL LIGATION STATUS: Chronic | ICD-10-CM

## 2022-08-05 DIAGNOSIS — M66.9 SPONTANEOUS RUPTURE OF UNSPECIFIED TENDON: ICD-10-CM

## 2022-08-05 DIAGNOSIS — Z90.49 ACQUIRED ABSENCE OF OTHER SPECIFIED PARTS OF DIGESTIVE TRACT: Chronic | ICD-10-CM

## 2022-08-05 DIAGNOSIS — Z00.00 ENCOUNTER FOR GENERAL ADULT MEDICAL EXAMINATION WITHOUT ABNORMAL FINDINGS: ICD-10-CM

## 2022-08-05 PROCEDURE — 99214 OFFICE O/P EST MOD 30 MIN: CPT

## 2022-08-05 PROCEDURE — G0463: CPT

## 2022-08-05 RX ORDER — BUPIVACAINE HYDROCHLORIDE 5 MG/ML
0.5 INJECTION, SOLUTION EPIDURAL; INTRACAUDAL; PERINEURAL
Refills: 0 | Status: COMPLETED | COMMUNITY
End: 2022-08-05

## 2022-08-05 RX ORDER — CEPHALEXIN 500 MG/1
500 CAPSULE ORAL
Qty: 28 | Refills: 0 | Status: COMPLETED | COMMUNITY
Start: 2022-03-26

## 2022-08-05 RX ORDER — OXYCODONE AND ACETAMINOPHEN 5; 325 MG/1; MG/1
5-325 TABLET ORAL
Qty: 30 | Refills: 0 | Status: COMPLETED | COMMUNITY
Start: 2022-03-26

## 2022-08-05 RX ORDER — RIMEGEPANT SULFATE 75 MG/75MG
75 TABLET, ORALLY DISINTEGRATING ORAL
Qty: 16 | Refills: 5 | Status: COMPLETED | COMMUNITY
Start: 2021-07-15 | End: 2022-08-05

## 2022-08-05 RX ORDER — TRIAMCINOLONE ACETONIDE 40 MG/ML
40 INJECTION, SUSPENSION INTRA-ARTICULAR; INTRAMUSCULAR
Refills: 0 | Status: COMPLETED | COMMUNITY
End: 2022-08-05

## 2022-08-05 RX ORDER — ONDANSETRON 4 MG/1
4 TABLET, ORALLY DISINTEGRATING ORAL
Qty: 15 | Refills: 0 | Status: COMPLETED | COMMUNITY
Start: 2022-02-09

## 2022-08-05 RX ORDER — TRIAMCINOLONE 4 MG
40 TABLET ORAL ONCE
Refills: 0 | Status: DISCONTINUED | OUTPATIENT
Start: 2022-08-05 | End: 2022-08-19

## 2022-08-05 RX ORDER — ERGOCALCIFEROL 1.25 MG/1
1.25 MG CAPSULE, LIQUID FILLED ORAL
Qty: 4 | Refills: 0 | Status: COMPLETED | COMMUNITY
Start: 2022-06-27

## 2022-08-05 RX ORDER — AMOXICILLIN AND CLAVULANATE POTASSIUM 875; 125 MG/1; MG/1
875-125 TABLET, COATED ORAL
Qty: 20 | Refills: 0 | Status: COMPLETED | COMMUNITY
Start: 2022-06-08

## 2022-08-05 RX ORDER — DEXAMETHASONE SODIUM PHOSPHATE 4 MG/ML
4 INJECTION, SOLUTION INTRAMUSCULAR; INTRAVENOUS
Refills: 0 | Status: COMPLETED | COMMUNITY
End: 2022-08-05

## 2022-08-05 RX ORDER — DEXAMETHASONE 0.5 MG/5ML
4 ELIXIR ORAL ONCE
Refills: 0 | Status: DISCONTINUED | OUTPATIENT
Start: 2022-08-05 | End: 2022-08-19

## 2022-08-05 NOTE — PHYSICAL EXAM
[2+] : left 2+ [Ankle Swelling (On Exam)] : not present [Varicose Veins Of Lower Extremities] : not present [] : not present [de-identified] : A&Ox3, NAD [de-identified] : Pain elicited on palpation of the right plantar medial calcaneal tubercle in the region of the origin of the intrinsic musculature and plantar fascia, but no pain on medial-lateral compression of the calcaneus. Intact posterior tibial tendon, strength graded at 5/5. Ankle joint dorsiflexion is 0 degrees with the knee extended bilateral. mri shows plantar fasciitis and plantar fascial tear. [de-identified] : No open lesions, no erythema, no ecchymosis, no fluctuance, no malodor, no proximal streaking [de-identified] : Light touch sensation intact bilaterally [de-identified] : right foot posterior tibialis 2+ and left foot posterior tibialis 2+ \par right foot dorsalis pedis 2+ and left foot dorsalis pedis 2+  [FreeTextEntry1] : Right Heel - No open wounds [de-identified] : none [de-identified] : none [de-identified] : Bupivacaine HCl injection 0.5% 5mg/mL [de-identified] : DPM injected patient with 1 mL of Kenalog 40mg/mL and 1 mL of Dexamethasone 4mg/mL. Patient tolerated injection well. [TWNoteComboBox6] : Other

## 2022-08-05 NOTE — PLAN
[FreeTextEntry1] : Advised regarding conservative vs surgical treatment options. Due to the level of pain I have also recommended a corticosteroid injection. The symptomatic area was prepped numerous times with an alcohol solution.  Once this completely dried it was cleansed with alcohol after which a corticosteroid injection, consisting of 1 cc of 0.5%marcaine plain, 1%, 1 cc of Dexamethasone, and 1 cc of kenalog-40 was infiltrated in and around the symptomatic area with good relief obtained.  Hemostasis was achieved with compression, the skin was cleansed, and a dry sterile dressing applied.  The patient was cautioned regarding hypopigmentation, fat atrophy, rupture of involved ligamentous and tendinous structures, and steroid flare. Discussed with patient that if symptoms do not resolve, she will need surgical intervention. Spent 30 minutes for patient care and medical decision making.

## 2022-08-05 NOTE — ASSESSMENT
[Verbal] : Verbal [Written] : Written [Demo] : Demo [Patient] : Patient [Family member] : Family member [Good - alert, interested, motivated] : Good - alert, interested, motivated [Demonstrates independently] : demonstrates independently [Foot Care] : foot care [Skin Care] : skin care [Signs and symptoms of infection] : sign and symptoms of infection [Surgery] : surgery [How and When to Call] : how and when to call [Nutrition] : nutrition [Labs and Tests] : labs and tests [Off-loading] : off-loading [Patient responsibility to plan of care] : patient responsibility to plan of care [] : Yes [Stable] : stable [Home] : Home [Ambulatory] : Ambulatory [Not Applicable - Long Term Care/Home Health Agency] : Long Term Care/Home Health Agency: Not Applicable [FreeTextEntry2] : Infection prevention\par Maintain optimal skin integrity to high pressure areas\par Nutrition and wound healing\par Offloading measures\par MRI [FreeTextEntry3] : initial visit

## 2022-08-05 NOTE — HISTORY OF PRESENT ILLNESS
[FreeTextEntry1] : Patient seen for right heel pain. pt relates that she had an mri of the right foot as advised. Patient relates that her pain is still present at this time.

## 2022-08-05 NOTE — VITALS
[Pain related to present condition?] : The patient's  pain is related to present condition. [Stabbing] : stabbing [] : No [de-identified] : 10/10 [FreeTextEntry3] : right heel  [FreeTextEntry1] : nothing [FreeTextEntry2] : after ambulating for long period(s) of time [FreeTextEntry4] : ARABELLA advised [FreeTextEntry5] : initial visit

## 2022-08-07 DIAGNOSIS — Z79.899 OTHER LONG TERM (CURRENT) DRUG THERAPY: ICD-10-CM

## 2022-08-07 DIAGNOSIS — Z82.49 FAMILY HISTORY OF ISCHEMIC HEART DISEASE AND OTHER DISEASES OF THE CIRCULATORY SYSTEM: ICD-10-CM

## 2022-08-07 DIAGNOSIS — Z83.0 FAMILY HISTORY OF HUMAN IMMUNODEFICIENCY VIRUS [HIV] DISEASE: ICD-10-CM

## 2022-08-07 DIAGNOSIS — M72.2 PLANTAR FASCIAL FIBROMATOSIS: ICD-10-CM

## 2022-08-07 DIAGNOSIS — Z98.890 OTHER SPECIFIED POSTPROCEDURAL STATES: ICD-10-CM

## 2022-08-07 DIAGNOSIS — Z90.49 ACQUIRED ABSENCE OF OTHER SPECIFIED PARTS OF DIGESTIVE TRACT: ICD-10-CM

## 2022-08-07 DIAGNOSIS — Z83.438 FAMILY HISTORY OF OTHER DISORDER OF LIPOPROTEIN METABOLISM AND OTHER LIPIDEMIA: ICD-10-CM

## 2022-08-07 DIAGNOSIS — Z90.710 ACQUIRED ABSENCE OF BOTH CERVIX AND UTERUS: ICD-10-CM

## 2023-08-02 ENCOUNTER — OUTPATIENT (OUTPATIENT)
Dept: OUTPATIENT SERVICES | Facility: HOSPITAL | Age: 48
LOS: 1 days | Discharge: HOME | End: 2023-08-02
Payer: COMMERCIAL

## 2023-08-02 ENCOUNTER — APPOINTMENT (OUTPATIENT)
Dept: WOUND CARE | Facility: HOSPITAL | Age: 48
End: 2023-08-02
Payer: MEDICAID

## 2023-08-02 VITALS
WEIGHT: 150 LBS | OXYGEN SATURATION: 99 % | DIASTOLIC BLOOD PRESSURE: 78 MMHG | RESPIRATION RATE: 16 BRPM | HEART RATE: 63 BPM | BODY MASS INDEX: 26.58 KG/M2 | SYSTOLIC BLOOD PRESSURE: 121 MMHG | HEIGHT: 63 IN | TEMPERATURE: 98 F

## 2023-08-02 DIAGNOSIS — Z83.438 FAMILY HISTORY OF OTHER DISORDER OF LIPOPROTEIN METABOLISM AND OTHER LIPIDEMIA: ICD-10-CM

## 2023-08-02 DIAGNOSIS — Z79.899 OTHER LONG TERM (CURRENT) DRUG THERAPY: ICD-10-CM

## 2023-08-02 DIAGNOSIS — M79.671 PAIN IN RIGHT FOOT: ICD-10-CM

## 2023-08-02 DIAGNOSIS — M72.2 PLANTAR FASCIAL FIBROMATOSIS: ICD-10-CM

## 2023-08-02 DIAGNOSIS — I10 ESSENTIAL (PRIMARY) HYPERTENSION: ICD-10-CM

## 2023-08-02 DIAGNOSIS — Z98.890 OTHER SPECIFIED POSTPROCEDURAL STATES: ICD-10-CM

## 2023-08-02 DIAGNOSIS — Z82.49 FAMILY HISTORY OF ISCHEMIC HEART DISEASE AND OTHER DISEASES OF THE CIRCULATORY SYSTEM: ICD-10-CM

## 2023-08-02 DIAGNOSIS — Z90.49 ACQUIRED ABSENCE OF OTHER SPECIFIED PARTS OF DIGESTIVE TRACT: ICD-10-CM

## 2023-08-02 DIAGNOSIS — Z90.49 ACQUIRED ABSENCE OF OTHER SPECIFIED PARTS OF DIGESTIVE TRACT: Chronic | ICD-10-CM

## 2023-08-02 DIAGNOSIS — Z98.51 TUBAL LIGATION STATUS: Chronic | ICD-10-CM

## 2023-08-02 DIAGNOSIS — Z90.710 ACQUIRED ABSENCE OF BOTH CERVIX AND UTERUS: ICD-10-CM

## 2023-08-02 DIAGNOSIS — Z83.3 FAMILY HISTORY OF DIABETES MELLITUS: ICD-10-CM

## 2023-08-02 PROCEDURE — 99213 OFFICE O/P EST LOW 20 MIN: CPT

## 2023-08-02 PROCEDURE — G0463: CPT

## 2023-08-02 RX ORDER — LOSARTAN POTASSIUM 100 MG/1
TABLET, FILM COATED ORAL
Refills: 0 | Status: ACTIVE | COMMUNITY

## 2023-08-02 NOTE — HISTORY OF PRESENT ILLNESS
[FreeTextEntry1] : LOBO LANDIN is being seen for a initial nursing assessment visit. Patient into [facility] for pain in the Right Medial Foot. No open wounds. Patient reports that pain "comes and goes", and seems to be worse after resting. Patient reports that after resting, once she stands up, the pain is present and it becomes. "hard to walk". Patient has been to the [facility] previously for the Right Medial Foot pain - last visit August 2022. Patient stated that she received, "injections" at the wound site. Patient accompanied by family member.

## 2023-08-02 NOTE — PHYSICAL EXAM
[de-identified] : DPM applied 4" ACE Wrap to Right foot for support. [FreeTextEntry1] : Right Medial Foot - No open wounds  [de-identified] : Circulation and Neuromuscular assessment done post application. [de-identified] : NONE [de-identified] : Mechanically cleansed with 0.9% Normal Saline [de-identified] : Right Dorsalis Pedis +2       Left Dorsalis Pedis   +2     Right Posterior Tibialis  +2 Left Posterior Tibialis  +2   Skin:   warm to touch  Color:  Pink Capillary Refill: [TWNoteComboBox4] : None [TWNoteComboBox5] : No [TWNoteComboBox6] : Other [de-identified] : No [de-identified] : Normal [de-identified] : None [de-identified] : None [de-identified] : 100% [de-identified] : No [de-identified] : Ace wraps

## 2023-08-02 NOTE — ASSESSMENT
[Verbal] : Verbal [Demo] : Demo [Patient] : Patient [Family member] : Family member [Good - alert, interested, motivated] : Good - alert, interested, motivated [Verbalizes knowledge/Understanding] : Verbalizes knowledge/understanding [Dressing changes] : dressing changes [Foot Care] : foot care [Skin Care] : skin care [Pressure relief] : pressure relief [Signs and symptoms of infection] : sign and symptoms of infection [Nutrition] : nutrition [How and When to Call] : how and when to call [Compression Therapy] : compression therapy [Patient responsibility to plan of care] : patient responsibility to plan of care [] : No [FreeTextEntry2] : Infection Prevention Wound care and Dressing changes Promote and Restore optimal skin integrity Nutrition and Wound Healing  Offloading and Pressure relief Protect and Guard wound site  Maintain acceptable levels of Pain Compliance R/T Compression therapy Compliance R/T Elevation of Lower Extremities Compliance with performing exercises for lower legs, ankle, feet. [FreeTextEntry3] : Initial Visit [FreeTextEntry4] : DPM assessed wound site. DPM discussed patient performing exercises to stretch lower legs, ankles, feet. DPM demonstrated exercises for patient.   DPM advised patient to wear a wider type of shoe - recommended New Balance, ASICS, THOMAS, Hokas to patient.  DPM discussed possibility of injections at wound site or the use of orthotics; Will be discussed at next visit.  Patient verbalized understanding of all discussed. Patient to return in three weeks for follow up. [Stable] : stable [Home] : Home [Ambulatory] : Ambulatory [Not Applicable - Long Term Care/Home Health Agency] : Long Term Care/Home Health Agency: Not Applicable

## 2023-08-16 PROBLEM — M72.2 PLANTAR FASCIITIS: Status: ACTIVE | Noted: 2021-09-22

## 2023-08-16 NOTE — ASSESSMENT
[] : No [FreeTextEntry2] : Infection Prevention Wound care and Dressing changes Promote and Restore optimal skin integrity Nutrition and Wound Healing  Offloading and Pressure relief Protect and Guard wound site  Maintain acceptable levels of Pain Compliance R/T Compression therapy Compliance R/T Elevation of Lower Extremities Compliance with performing exercises for lower legs, ankle, feet. [FreeTextEntry3] : Initial Visit [FreeTextEntry4] : DPM assessed wound site. DPM discussed patient performing exercises to stretch lower legs, ankles, feet. DPM demonstrated exercises for patient.   DPM advised patient to wear a wider type of shoe - recommended New Balance, ASICS, THOMAS, Hokas to patient.  DPM discussed possibility of injections at wound site or the use of orthotics; Will be discussed at next visit.  Patient verbalized understanding of all discussed. Patient to return in three weeks for follow up.

## 2023-08-16 NOTE — PLAN
[FreeTextEntry1] : Advised regarding conservative vs surgical treatment options. Discussed with patient to wear supportive shoe gear with wide toed shoes and extra depth in toe box to accommodate the deformity. Advised patient to perform stretching exercises. Discussed with patient that if symptoms do not resolve, she will need to obtain x- rays to assess for any osseous pathology and if the pain increases will recommend injection, possible physical therapy and if all conservative treatment fails will need surgical intervention. Spent 30 minutes for patient care and medical decision making.

## 2023-08-16 NOTE — PHYSICAL EXAM
[2+] : left 2+ [Ankle Swelling (On Exam)] : not present [Varicose Veins Of Lower Extremities] : not present [] : not present [de-identified] : A&Ox3, NAD [de-identified] : Pain elicited on palpation of the right plantar medial calcaneal tubercle in the region of the origin of the intrinsic musculature and plantar fascia, but no pain on medial-lateral compression of the calcaneus. Intact posterior tibial tendon, strength graded at 5/5. Ankle joint dorsiflexion is 0 degrees with the knee extended bilateral. mri shows plantar fasciitis and plantar fascial tear. [de-identified] : No open lesions, no erythema, no ecchymosis, no fluctuance, no malodor, no proximal streaking [de-identified] : Light touch sensation intact bilaterally [de-identified] : DPM applied 4" ACE Wrap to Right foot for support. [FreeTextEntry1] : Right Medial Foot - No open wounds  [de-identified] : Circulation and Neuromuscular assessment done post application. [de-identified] : NONE [de-identified] : Mechanically cleansed with 0.9% Normal Saline [de-identified] : Right Dorsalis Pedis +2       Left Dorsalis Pedis   +2     Right Posterior Tibialis  +2 Left Posterior Tibialis  +2   Skin:   warm to touch  Color:  Pink Capillary Refill: [TWNoteComboBox4] : None [TWNoteComboBox5] : No [TWNoteComboBox6] : Other [de-identified] : No [de-identified] : Normal [de-identified] : None [de-identified] : None [de-identified] : 100% [de-identified] : No [de-identified] : Ace wraps

## 2023-08-16 NOTE — HISTORY OF PRESENT ILLNESS
[FreeTextEntry1] : LOBO LANDIN is being seen for a initial nursing assessment visit. Patient into [facility] for pain in the Right Medial Foot. No open wounds. Patient reports that pain "comes and goes", and seems to be worse after resting. Patient reports that after resting, once she stands up, the pain is present and it becomes. "hard to walk". Patient has been to the [facility] previously for the Right Medial Foot pain - last visit August 2022. Patient stated that she received, "injections" at the wound site. Patient accompanied by family member.

## 2023-08-23 ENCOUNTER — APPOINTMENT (OUTPATIENT)
Dept: WOUND CARE | Facility: HOSPITAL | Age: 48
End: 2023-08-23

## 2023-10-11 ENCOUNTER — EMERGENCY (EMERGENCY)
Facility: HOSPITAL | Age: 48
LOS: 1 days | Discharge: HOME | End: 2023-10-11
Attending: EMERGENCY MEDICINE
Payer: COMMERCIAL

## 2023-10-11 VITALS
SYSTOLIC BLOOD PRESSURE: 137 MMHG | HEART RATE: 62 BPM | TEMPERATURE: 98 F | RESPIRATION RATE: 18 BRPM | OXYGEN SATURATION: 98 % | DIASTOLIC BLOOD PRESSURE: 82 MMHG

## 2023-10-11 VITALS
TEMPERATURE: 98 F | WEIGHT: 160.06 LBS | HEART RATE: 70 BPM | OXYGEN SATURATION: 100 % | DIASTOLIC BLOOD PRESSURE: 84 MMHG | HEIGHT: 63 IN | RESPIRATION RATE: 16 BRPM | SYSTOLIC BLOOD PRESSURE: 156 MMHG

## 2023-10-11 DIAGNOSIS — Z90.49 ACQUIRED ABSENCE OF OTHER SPECIFIED PARTS OF DIGESTIVE TRACT: Chronic | ICD-10-CM

## 2023-10-11 DIAGNOSIS — Z98.51 TUBAL LIGATION STATUS: Chronic | ICD-10-CM

## 2023-10-11 LAB
A1C WITH ESTIMATED AVERAGE GLUCOSE RESULT: 6.3 % — HIGH (ref 4–5.6)
ALBUMIN SERPL ELPH-MCNC: 4.6 G/DL — SIGNIFICANT CHANGE UP (ref 3.3–5.2)
ALP SERPL-CCNC: 54 U/L — SIGNIFICANT CHANGE UP (ref 40–120)
ALT FLD-CCNC: 17 U/L — SIGNIFICANT CHANGE UP
ANION GAP SERPL CALC-SCNC: 13 MMOL/L — SIGNIFICANT CHANGE UP (ref 5–17)
APPEARANCE UR: CLEAR — SIGNIFICANT CHANGE UP
AST SERPL-CCNC: 21 U/L — SIGNIFICANT CHANGE UP
BACTERIA # UR AUTO: ABNORMAL
BASOPHILS # BLD AUTO: 0.04 K/UL — SIGNIFICANT CHANGE UP (ref 0–0.2)
BASOPHILS NFR BLD AUTO: 1 % — SIGNIFICANT CHANGE UP (ref 0–2)
BILIRUB SERPL-MCNC: 0.3 MG/DL — LOW (ref 0.4–2)
BILIRUB UR-MCNC: NEGATIVE — SIGNIFICANT CHANGE UP
BUN SERPL-MCNC: 16.5 MG/DL — SIGNIFICANT CHANGE UP (ref 8–20)
CALCIUM SERPL-MCNC: 9.6 MG/DL — SIGNIFICANT CHANGE UP (ref 8.4–10.5)
CHLORIDE SERPL-SCNC: 99 MMOL/L — SIGNIFICANT CHANGE UP (ref 96–108)
CO2 SERPL-SCNC: 25 MMOL/L — SIGNIFICANT CHANGE UP (ref 22–29)
COLOR SPEC: YELLOW — SIGNIFICANT CHANGE UP
CREAT SERPL-MCNC: 0.74 MG/DL — SIGNIFICANT CHANGE UP (ref 0.5–1.3)
DIFF PNL FLD: ABNORMAL
EGFR: 98 ML/MIN/1.73M2 — SIGNIFICANT CHANGE UP
EOSINOPHIL # BLD AUTO: 0.01 K/UL — SIGNIFICANT CHANGE UP (ref 0–0.5)
EOSINOPHIL NFR BLD AUTO: 0.3 % — SIGNIFICANT CHANGE UP (ref 0–6)
EPI CELLS # UR: SIGNIFICANT CHANGE UP
ESTIMATED AVERAGE GLUCOSE: 134 MG/DL — HIGH (ref 68–114)
GLUCOSE SERPL-MCNC: 137 MG/DL — HIGH (ref 70–99)
GLUCOSE UR QL: NEGATIVE MG/DL — SIGNIFICANT CHANGE UP
HCT VFR BLD CALC: 38.7 % — SIGNIFICANT CHANGE UP (ref 34.5–45)
HGB BLD-MCNC: 12.7 G/DL — SIGNIFICANT CHANGE UP (ref 11.5–15.5)
IMM GRANULOCYTES NFR BLD AUTO: 0.3 % — SIGNIFICANT CHANGE UP (ref 0–0.9)
KETONES UR-MCNC: ABNORMAL
LEUKOCYTE ESTERASE UR-ACNC: NEGATIVE — SIGNIFICANT CHANGE UP
LYMPHOCYTES # BLD AUTO: 1.16 K/UL — SIGNIFICANT CHANGE UP (ref 1–3.3)
LYMPHOCYTES # BLD AUTO: 30.4 % — SIGNIFICANT CHANGE UP (ref 13–44)
MAGNESIUM SERPL-MCNC: 2.1 MG/DL — SIGNIFICANT CHANGE UP (ref 1.6–2.6)
MCHC RBC-ENTMCNC: 27.9 PG — SIGNIFICANT CHANGE UP (ref 27–34)
MCHC RBC-ENTMCNC: 32.8 GM/DL — SIGNIFICANT CHANGE UP (ref 32–36)
MCV RBC AUTO: 85.1 FL — SIGNIFICANT CHANGE UP (ref 80–100)
MONOCYTES # BLD AUTO: 0.3 K/UL — SIGNIFICANT CHANGE UP (ref 0–0.9)
MONOCYTES NFR BLD AUTO: 7.9 % — SIGNIFICANT CHANGE UP (ref 2–14)
NEUTROPHILS # BLD AUTO: 2.3 K/UL — SIGNIFICANT CHANGE UP (ref 1.8–7.4)
NEUTROPHILS NFR BLD AUTO: 60.1 % — SIGNIFICANT CHANGE UP (ref 43–77)
NITRITE UR-MCNC: NEGATIVE — SIGNIFICANT CHANGE UP
PH UR: 6 — SIGNIFICANT CHANGE UP (ref 5–8)
PLATELET # BLD AUTO: 270 K/UL — SIGNIFICANT CHANGE UP (ref 150–400)
POTASSIUM SERPL-MCNC: 3.7 MMOL/L — SIGNIFICANT CHANGE UP (ref 3.5–5.3)
POTASSIUM SERPL-SCNC: 3.7 MMOL/L — SIGNIFICANT CHANGE UP (ref 3.5–5.3)
PROT SERPL-MCNC: 7.9 G/DL — SIGNIFICANT CHANGE UP (ref 6.6–8.7)
PROT UR-MCNC: NEGATIVE — SIGNIFICANT CHANGE UP
RBC # BLD: 4.55 M/UL — SIGNIFICANT CHANGE UP (ref 3.8–5.2)
RBC # FLD: 13.7 % — SIGNIFICANT CHANGE UP (ref 10.3–14.5)
RBC CASTS # UR COMP ASSIST: SIGNIFICANT CHANGE UP /HPF (ref 0–4)
SODIUM SERPL-SCNC: 137 MMOL/L — SIGNIFICANT CHANGE UP (ref 135–145)
SP GR SPEC: 1.01 — SIGNIFICANT CHANGE UP (ref 1.01–1.02)
TSH SERPL-MCNC: 1.31 UIU/ML — SIGNIFICANT CHANGE UP (ref 0.27–4.2)
UROBILINOGEN FLD QL: NEGATIVE MG/DL — SIGNIFICANT CHANGE UP
WBC # BLD: 3.82 K/UL — SIGNIFICANT CHANGE UP (ref 3.8–10.5)
WBC # FLD AUTO: 3.82 K/UL — SIGNIFICANT CHANGE UP (ref 3.8–10.5)
WBC UR QL: SIGNIFICANT CHANGE UP /HPF (ref 0–5)

## 2023-10-11 PROCEDURE — 85025 COMPLETE CBC W/AUTO DIFF WBC: CPT

## 2023-10-11 PROCEDURE — 36415 COLL VENOUS BLD VENIPUNCTURE: CPT

## 2023-10-11 PROCEDURE — 80053 COMPREHEN METABOLIC PANEL: CPT

## 2023-10-11 PROCEDURE — 96374 THER/PROPH/DIAG INJ IV PUSH: CPT

## 2023-10-11 PROCEDURE — 99284 EMERGENCY DEPT VISIT MOD MDM: CPT | Mod: 25

## 2023-10-11 PROCEDURE — 82962 GLUCOSE BLOOD TEST: CPT

## 2023-10-11 PROCEDURE — 93005 ELECTROCARDIOGRAM TRACING: CPT

## 2023-10-11 PROCEDURE — 93010 ELECTROCARDIOGRAM REPORT: CPT

## 2023-10-11 PROCEDURE — 83735 ASSAY OF MAGNESIUM: CPT

## 2023-10-11 PROCEDURE — 96375 TX/PRO/DX INJ NEW DRUG ADDON: CPT

## 2023-10-11 PROCEDURE — 99284 EMERGENCY DEPT VISIT MOD MDM: CPT

## 2023-10-11 PROCEDURE — T1013: CPT

## 2023-10-11 PROCEDURE — 83036 HEMOGLOBIN GLYCOSYLATED A1C: CPT

## 2023-10-11 PROCEDURE — 84443 ASSAY THYROID STIM HORMONE: CPT

## 2023-10-11 PROCEDURE — 81001 URINALYSIS AUTO W/SCOPE: CPT

## 2023-10-11 RX ORDER — MECLIZINE HCL 12.5 MG
50 TABLET ORAL ONCE
Refills: 0 | Status: COMPLETED | OUTPATIENT
Start: 2023-10-11 | End: 2023-10-11

## 2023-10-11 RX ORDER — MECLIZINE HCL 12.5 MG
1 TABLET ORAL
Qty: 20 | Refills: 0
Start: 2023-10-11 | End: 2023-10-15

## 2023-10-11 RX ORDER — ALPRAZOLAM 0.25 MG
0.25 TABLET ORAL ONCE
Refills: 0 | Status: DISCONTINUED | OUTPATIENT
Start: 2023-10-11 | End: 2023-10-11

## 2023-10-11 RX ORDER — METOCLOPRAMIDE HCL 10 MG
10 TABLET ORAL ONCE
Refills: 0 | Status: COMPLETED | OUTPATIENT
Start: 2023-10-11 | End: 2023-10-11

## 2023-10-11 RX ORDER — ONDANSETRON 8 MG/1
4 TABLET, FILM COATED ORAL ONCE
Refills: 0 | Status: COMPLETED | OUTPATIENT
Start: 2023-10-11 | End: 2023-10-11

## 2023-10-11 RX ADMIN — ONDANSETRON 4 MILLIGRAM(S): 8 TABLET, FILM COATED ORAL at 19:16

## 2023-10-11 RX ADMIN — Medication 0.25 MILLIGRAM(S): at 20:07

## 2023-10-11 RX ADMIN — Medication 50 MILLIGRAM(S): at 20:08

## 2023-10-11 RX ADMIN — Medication 10 MILLIGRAM(S): at 19:52

## 2023-10-11 RX ADMIN — Medication 50 MILLIGRAM(S): at 18:46

## 2023-10-11 NOTE — ED PROVIDER NOTE - PHYSICAL EXAMINATION
Gen: NAD, AOx3  Head: NCAT  HEENT: EOMI, oral mucosa moist, normal conjunctiva, neck supple  Lung: CTAB, no respiratory distress  CV: rrr, no murmur, Normal perfusion  Abd: soft, NTND  MSK: No edema, no visible deformities  Neuro: No focal neurologic deficits, CN II-XII intact, 5/5 global strength, sensation intact, no dysmetria/ataxia  Skin: No rash   Psych: normal affect

## 2023-10-11 NOTE — ED ADULT NURSE NOTE - OBJECTIVE STATEMENT
patient present to ED reporting dizziness since 1:30pm. hypoglycemic in ambulance. patient denies cp, sob, heart palpataions, fevers, chills, denies urinary symptoms

## 2023-10-11 NOTE — ED ADULT NURSE NOTE - NSFALLUNIVINTERV_ED_ALL_ED
Bed/Stretcher in lowest position, wheels locked, appropriate side rails in place/Call bell, personal items and telephone in reach/Instruct patient to call for assistance before getting out of bed/chair/stretcher/Non-slip footwear applied when patient is off stretcher/[place] to call system/Physically safe environment - no spills, clutter or unnecessary equipment/Purposeful proactive rounding/Room/bathroom lighting operational, light cord in reach

## 2023-10-11 NOTE — ED ADULT TRIAGE NOTE - CHIEF COMPLAINT QUOTE
reports "dizziness. room spinning. I cant walk. I had a stroke in the past." fingerstick originally 44. s/p oral dextrose 119. pt reports symptoms were not relieved by glucose replacement. pt denies insulin or oral hypoglycemic medication use.

## 2023-10-11 NOTE — ED PROVIDER NOTE - CLINICAL SUMMARY MEDICAL DECISION MAKING FREE TEXT BOX
Patient with dizziness in setting of hypoglycemia likely due to poor p.o. intake.  Improvement after dextrose given by EMS.  Patient was doing well in the ED and had a recurrent episode of dizziness with change in position and meclizine given labs  without significant abnormalities A1c sent at request of family.  Patient to follow-up with outpatient provider.    Patient signed out to incoming physician pending results of testing and reassessment.  All decisions regarding the progression of care will be made at their discretion.

## 2023-10-11 NOTE — ED PROVIDER NOTE - PATIENT PORTAL LINK FT
You can access the FollowMyHealth Patient Portal offered by [facility] by registering at the following website: [email or web address]. By joining [facility]’s FollowMyHealth portal, you will also be able to view your health information using other applications (apps) compatible with our system.

## 2023-10-11 NOTE — ED PROVIDER NOTE - CARE PROVIDER_API CALL
Siva Mena  Neurology  [address] [place] [address], [facility] [address]  [place] [address]  Phone: [phone]  Fax: [phone]  Follow Up Time: 7-10 Days

## 2023-10-11 NOTE — ED PROVIDER NOTE - OBJECTIVE STATEMENT
48-year-old female with hypertension and prediabetes.  States she was cleaning her room she did not eat anything today.  Started to feel dizzy lightheaded.  Was noted to be hypoglycemic on EMS arrival given dextrose with improvement.  No headache no focal weakness able to walk without assistance.  No vomiting no diarrhea no fevers no chills no chest pain or shortness of breath.  Takes hydrochlorothiazide and losartan only.

## 2023-10-11 NOTE — ED PROVIDER NOTE - NSICDXPASTSURGICALHX_GEN_ALL_CORE_FT
PAST SURGICAL HISTORY:  H/O tubal ligation     History of appendectomy     No significant past surgical history

## 2023-10-11 NOTE — ED PROVIDER NOTE - PROGRESS NOTE DETAILS
Adam WISE: Patient feeling a little bit better but still dizzy.  Positive left beating nystagmus on right gaze.  Consistent with positional vertigo.  Patient feeling tired, will let patient sleep and reassess in an hour. Adam WISE: Symptoms have resolved, patient tolerating p.o., no longer dizzy.  Ready for discharge.  Provided with meclizine and neurology follow-up.  Also gave instructions regarding Epley maneuver and half somersault maneuver.

## 2023-10-11 NOTE — ED ADULT NURSE REASSESSMENT NOTE - NS ED NURSE REASSESS COMMENT FT1
PT is A&Ox4, PT Currently fetal position in Ed stretcher actively vomiting, pt denies complaints of chest pain or shortness of breath, skin warm dry and unremarkable. Updated pt on plan of care and pt expresses understanding.

## 2023-10-11 NOTE — ED PROVIDER NOTE - NSFOLLOWUPINSTRUCTIONS_ED_ALL_ED_FT
1. Regrese al servicio de urgencias si los síntomas empeoran, son progresivos o cualquier otro síntoma preocupante  2. Magdaleno un seguimiento con rogers médico de atención primaria en 2-3 días.   3.   [place] 25 mg de meclizina anna veces al día neelam 3-5 días. \\      Vértigo      El vértigo es la sensación de que usted o todo lo que lo rodea se mueve cuando en realidad eso no sucede. El vértigo puede ser peligroso si ocurre mientras está haciendo algo que podría suponer un riesgo para usted y para los demás, por ejemplo, conduciendo un automóvil.    ¿Cuáles son las causas?  Esta afección es causada por zohreh alteración en las señales que rogers sistema sensorial envía al cerebro. Existen diferentes factores que pueden causar esta alteración, por ejemplo:    Infecciones, especialmente en el oído interno.  Zohreh reacción adversa a un medicamento o el uso indebido de alcohol y medicamentos.  Abstinencia de drogas o alcohol.  Cambios rápidos de posición, vee al acostarse o darse vuelta en la cama.  Cefaleas migrañosas.  Zohreh disminución del flujo sanguíneo hacia el cerebro.  Zohreh disminución de la presión arterial.  Un aumento de la presión en el cerebro por un traumatismo en la devante o el muna, accidente cerebrovascular, infección, tumor o sangrado.  Trastornos del sistema nervioso central.    ¿Cuáles son los signos o los síntomas?  Generalmente, los síntomas de carly trastorno se presentan al  la devante o los ojos en diferentes direcciones. Los síntomas pueden aparecer repentinamente y suelen durar menos de un minuto. Entre los síntomas se pueden incluir los siguientes:    Pérdida del equilibrio y caídas.  Sensación de estar dando vueltas o moviéndose.  Sensación de que el entorno está dando vueltas o moviéndose.  Náuseas y vómitos.  Visión doble o borrosa.  Dificultad para escuchar.  Hablar arrastrando las palabras.  Mareos.  Movimientos oculares involuntarios (nistagmo).    Los síntomas pueden ser leves y un poco molestos, o pueden ser graves e interferir con la loren cotidiana. Los episodios de vértigo pueden repetirse (ser recurrentes) a lo val del tiempo y algunos movimientos pueden desencadenarlos. Los síntomas pueden mejorar con el tiempo.    ¿Cómo se diagnostica?  Esta afección puede diagnosticarse en función de la historia clínica y la calidad del nistagmo. Rogers médico podrá examinar el movimiento de temi ojos indicándole que los cambie de dirección rápidamente para provocar el nistagmo. [place] se llama también prueba de Simone-Hallpike, prueba de impulso cefálico o prueba de rotación. William vez lo deriven a un médico especialista en oído, nariz y garganta (otorrinolaringólogo) o a kash que se especializa en trastornos del sistema nervioso central (neurólogo).    Pueden hacerle otros estudios, entre ellos:    Un examen físico.  Análisis de john.  Resonancia magnética (RM).  Zohreh exploración por tomografía computarizada (TC).  Un electrocardiograma (ECG). Carly estudio registra la actividad eléctrica del corazón.  Un electroencefalograma (EEG). Carly estudio registra la actividad eléctrica del cerebro.  Pruebas de audición.    ¿Cómo se trata?  El tratamiento de esta afección depende de la causa y la gravedad de los síntomas. Las opciones de tratamiento incluyen:    Medicamentos para tratar las náuseas o el vértigo. Se utilizan generalmente para los casos graves. Algunos medicamentos que se utilizan para tratar otras afecciones también podrían reducir o eliminar los síntomas del vértigo. Estos incluyen los siguientes:  Medicamentos para controlar las alergias (antihistamínicos).  Medicamentos para controlar las convulsiones (anticonvulsivos).  Medicamentos para aliviar la depresión (antidepresivos).  Medicamentos para aliviar la ansiedad (sedantes).  Movimientos de devante para acomodar el oído interno otra vez a la normalidad. Si el vértigo es causado por un problema en el oído, rogers médico podría recomendarle que magdaleno ciertos movimientos para corregir el problema.  Cirugía. [place] es raro.    Siga estas indicaciones en rogers casa:  Seguridad    Muévase despacio.No magdaleno movimientos bruscos con el cuerpo o con la devante.  No conduzca.  No opere maquinaria pesada.  No magdaleno ninguna tarea que podría ser peligrosa para usted o para otras personas en patricia de que ocurriera un episodio de vértigo.  Si tiene dificultad para caminar o mantener el equilibrio, use un bastón para mantener la estabilidad. Si se siente mareado o inestable, siéntese de inmediato.  Retome temi actividades normales vee se lo haya indicado el médico. Pregúntele al médico qué actividades son seguras para usted.    Instrucciones generales    [place] los medicamentos de venta shahzad y los recetados solamente vee se lo haya indicado el médico.  Evite algunas posiciones o determinados movimientos vee se lo haya indicado el médico.  [place] suficiente líquido para mantener la orina mandi o de color amarillo pálido.  Concurra a todas las visitas de seguimiento vee se lo haya indicado el médico. [place] es importante.    Comuníquese con un médico si:  Los medicamentos no le alivian el vértigo o carly empeora.  Tiene fiebre.  Rogers afección empeora o presenta síntomas nuevos.  Temi familiares o amigos advierten cambios en rogers comportamiento.  Las náuseas o los vómitos empeoran.  Tiene sensación de adormecimiento o de “hormigueo” en zohreh parte del cuerpo.    Solicite ayuda de inmediato si:  Tiene dificultad para hablar o para moverse.  Esta mareado todo el tiempo.  Se desmaya.  Tiene batsheva de devante intensos.  Tiene debilidad en las marizol, los brazos o las piernas.  Presenta cambios en la audición o la visión.  Siente rigidez en el muna.  Tiene sensibilidad a la frank.    NOTAS ADICIONALES E INSTRUCCIONES    [place] meclizine 25mg cada 6 horas para el mareo.  Por favor tenga seguimiento con rogers doctor primario entre 2 christianson.  Regrese a urgencias por cualquier preocupacion medica.

## 2024-06-17 ENCOUNTER — APPOINTMENT (OUTPATIENT)
Dept: NEUROLOGY | Facility: CLINIC | Age: 49
End: 2024-06-17

## 2025-01-17 NOTE — ED PROVIDER NOTE - CROS ED PSYCH ALL NEG
Detail Level: Detailed Add 86596 Cpt? (Important Note: In 2017 The Use Of 15525 Is Being Tracked By Cms To Determine Future Global Period Reimbursement For Global Periods): no Wound Evaluated By: Dr. Tang Additional Comments: Due to the sensitivity of the area, no postoperative photo was taken, Upon further inspection, in the affected area the sutures were not present\\nPatient states no distress or discomfort is present at this time. \\nPatient was given benzoyl peroxide cleanser to wash the commonly affected the area once daily as needed negative...

## 2025-04-23 ENCOUNTER — OUTPATIENT (OUTPATIENT)
Dept: OUTPATIENT SERVICES | Facility: HOSPITAL | Age: 50
LOS: 1 days | End: 2025-04-23
Payer: COMMERCIAL

## 2025-04-23 ENCOUNTER — APPOINTMENT (OUTPATIENT)
Dept: WOUND CARE | Facility: HOSPITAL | Age: 50
End: 2025-04-23
Payer: MEDICAID

## 2025-04-23 VITALS
DIASTOLIC BLOOD PRESSURE: 82 MMHG | OXYGEN SATURATION: 99 % | BODY MASS INDEX: 29.23 KG/M2 | HEART RATE: 70 BPM | HEIGHT: 63 IN | RESPIRATION RATE: 16 BRPM | TEMPERATURE: 97.9 F | WEIGHT: 165 LBS | SYSTOLIC BLOOD PRESSURE: 129 MMHG

## 2025-04-23 DIAGNOSIS — M79.671 PAIN IN RIGHT FOOT: ICD-10-CM

## 2025-04-23 DIAGNOSIS — Z90.49 ACQUIRED ABSENCE OF OTHER SPECIFIED PARTS OF DIGESTIVE TRACT: Chronic | ICD-10-CM

## 2025-04-23 DIAGNOSIS — Z98.51 TUBAL LIGATION STATUS: Chronic | ICD-10-CM

## 2025-04-23 DIAGNOSIS — M72.2 PLANTAR FASCIAL FIBROMATOSIS: ICD-10-CM

## 2025-04-23 PROCEDURE — 20550 NJX 1 TENDON SHEATH/LIGAMENT: CPT | Mod: LT

## 2025-04-23 PROCEDURE — 99214 OFFICE O/P EST MOD 30 MIN: CPT | Mod: 25

## 2025-04-23 PROCEDURE — G0463: CPT | Mod: 25

## 2025-04-23 RX ORDER — DEXAMETHASONE 0.5 MG/1
4 TABLET ORAL ONCE
Refills: 0 | Status: ACTIVE | OUTPATIENT
Start: 2025-04-23

## 2025-04-23 RX ORDER — TRIAMCINOLONE ACETONIDE 40 MG/ML
40 INJECTION, SUSPENSION INTRA-ARTICULAR; INTRAMUSCULAR ONCE
Refills: 0 | Status: ACTIVE | OUTPATIENT
Start: 2025-04-23

## 2025-04-28 DIAGNOSIS — Z98.890 OTHER SPECIFIED POSTPROCEDURAL STATES: ICD-10-CM

## 2025-04-28 DIAGNOSIS — Z82.49 FAMILY HISTORY OF ISCHEMIC HEART DISEASE AND OTHER DISEASES OF THE CIRCULATORY SYSTEM: ICD-10-CM

## 2025-04-28 DIAGNOSIS — M72.2 PLANTAR FASCIAL FIBROMATOSIS: ICD-10-CM

## 2025-04-28 DIAGNOSIS — Z83.3 FAMILY HISTORY OF DIABETES MELLITUS: ICD-10-CM

## 2025-04-28 DIAGNOSIS — I10 ESSENTIAL (PRIMARY) HYPERTENSION: ICD-10-CM

## 2025-04-28 DIAGNOSIS — Z90.710 ACQUIRED ABSENCE OF BOTH CERVIX AND UTERUS: ICD-10-CM

## 2025-04-28 DIAGNOSIS — Z83.438 FAMILY HISTORY OF OTHER DISORDER OF LIPOPROTEIN METABOLISM AND OTHER LIPIDEMIA: ICD-10-CM

## 2025-04-28 DIAGNOSIS — Z86.59 PERSONAL HISTORY OF OTHER MENTAL AND BEHAVIORAL DISORDERS: ICD-10-CM
